# Patient Record
Sex: MALE | Race: WHITE | NOT HISPANIC OR LATINO | Employment: FULL TIME | URBAN - NONMETROPOLITAN AREA
[De-identification: names, ages, dates, MRNs, and addresses within clinical notes are randomized per-mention and may not be internally consistent; named-entity substitution may affect disease eponyms.]

---

## 2021-07-10 ENCOUNTER — HOSPITAL ENCOUNTER (OUTPATIENT)
Facility: HOSPITAL | Age: 51
Setting detail: OBSERVATION
Discharge: HOME | End: 2021-07-11
Attending: FAMILY MEDICINE | Admitting: INTERNAL MEDICINE

## 2021-07-10 ENCOUNTER — APPOINTMENT (OUTPATIENT)
Dept: GENERAL RADIOLOGY | Facility: HOSPITAL | Age: 51
End: 2021-07-10

## 2021-07-10 DIAGNOSIS — I48.91 NEW ONSET ATRIAL FIBRILLATION (HCC): Primary | ICD-10-CM

## 2021-07-10 LAB
ALBUMIN SERPL-MCNC: 4.6 G/DL (ref 3.5–5.2)
ALBUMIN/GLOB SERPL: 1.8 G/DL
ALP SERPL-CCNC: 82 U/L (ref 39–117)
ALT SERPL W P-5'-P-CCNC: 41 U/L (ref 1–41)
ANION GAP SERPL CALCULATED.3IONS-SCNC: 11 MMOL/L (ref 5–15)
AST SERPL-CCNC: 35 U/L (ref 1–40)
BASOPHILS # BLD AUTO: 0.1 10*3/MM3 (ref 0–0.2)
BASOPHILS NFR BLD AUTO: 0.6 % (ref 0–1.5)
BILIRUB SERPL-MCNC: 0.4 MG/DL (ref 0–1.2)
BUN SERPL-MCNC: 16 MG/DL (ref 6–20)
BUN/CREAT SERPL: 10.7 (ref 7–25)
CALCIUM SPEC-SCNC: 10.7 MG/DL (ref 8.6–10.5)
CHLORIDE SERPL-SCNC: 99 MMOL/L (ref 98–107)
CHOLEST SERPL-MCNC: 143 MG/DL (ref 0–200)
CK MB SERPL-CCNC: 1.88 NG/ML
CK SERPL-CCNC: 182 U/L (ref 20–200)
CO2 SERPL-SCNC: 23 MMOL/L (ref 22–29)
CREAT SERPL-MCNC: 1.5 MG/DL (ref 0.76–1.27)
D-DIMER, QUANTITATIVE (MAD,POW, STR): 406 NG/ML (FEU) (ref 0–470)
DEPRECATED RDW RBC AUTO: 38.7 FL (ref 37–54)
EOSINOPHIL # BLD AUTO: 0.08 10*3/MM3 (ref 0–0.4)
EOSINOPHIL NFR BLD AUTO: 0.5 % (ref 0.3–6.2)
ERYTHROCYTE [DISTWIDTH] IN BLOOD BY AUTOMATED COUNT: 12 % (ref 12.3–15.4)
FLUAV SUBTYP SPEC NAA+PROBE: NOT DETECTED
FLUBV RNA ISLT QL NAA+PROBE: NOT DETECTED
GFR SERPL CREATININE-BSD FRML MDRD: 50 ML/MIN/1.73
GFR SERPL CREATININE-BSD FRML MDRD: 60 ML/MIN/1.73
GLOBULIN UR ELPH-MCNC: 2.5 GM/DL
GLUCOSE SERPL-MCNC: 126 MG/DL (ref 65–99)
HCT VFR BLD AUTO: 48.3 % (ref 37.5–51)
HDLC SERPL-MCNC: 56 MG/DL (ref 40–60)
HGB BLD-MCNC: 16.2 G/DL (ref 13–17.7)
HOLD SPECIMEN: NORMAL
IMM GRANULOCYTES # BLD AUTO: 0.18 10*3/MM3 (ref 0–0.05)
IMM GRANULOCYTES NFR BLD AUTO: 1 % (ref 0–0.5)
LDLC SERPL CALC-MCNC: 55 MG/DL (ref 0–100)
LDLC/HDLC SERPL: 0.84 {RATIO}
LYMPHOCYTES # BLD AUTO: 2.88 10*3/MM3 (ref 0.7–3.1)
LYMPHOCYTES NFR BLD AUTO: 16.2 % (ref 19.6–45.3)
MAGNESIUM SERPL-MCNC: 1.9 MG/DL (ref 1.6–2.6)
MCH RBC QN AUTO: 29.8 PG (ref 26.6–33)
MCHC RBC AUTO-ENTMCNC: 33.5 G/DL (ref 31.5–35.7)
MCV RBC AUTO: 89 FL (ref 79–97)
MONOCYTES # BLD AUTO: 1.41 10*3/MM3 (ref 0.1–0.9)
MONOCYTES NFR BLD AUTO: 7.9 % (ref 5–12)
NEUTROPHILS NFR BLD AUTO: 13.1 10*3/MM3 (ref 1.7–7)
NEUTROPHILS NFR BLD AUTO: 73.8 % (ref 42.7–76)
NRBC BLD AUTO-RTO: 0 /100 WBC (ref 0–0.2)
PLATELET # BLD AUTO: 299 10*3/MM3 (ref 140–450)
PMV BLD AUTO: 10.1 FL (ref 6–12)
POTASSIUM SERPL-SCNC: 3.5 MMOL/L (ref 3.5–5.2)
PROT SERPL-MCNC: 7.1 G/DL (ref 6–8.5)
QT INTERVAL: 318 MS
QTC INTERVAL: 412 MS
RBC # BLD AUTO: 5.43 10*6/MM3 (ref 4.14–5.8)
SARS-COV-2 RNA PNL SPEC NAA+PROBE: NOT DETECTED
SODIUM SERPL-SCNC: 133 MMOL/L (ref 136–145)
TRIGL SERPL-MCNC: 199 MG/DL (ref 0–150)
TROPONIN T SERPL-MCNC: 0.12 NG/ML (ref 0–0.03)
TROPONIN T SERPL-MCNC: <0.01 NG/ML (ref 0–0.03)
TSH SERPL DL<=0.05 MIU/L-ACNC: 1.36 UIU/ML (ref 0.27–4.2)
VLDLC SERPL-MCNC: 32 MG/DL (ref 5–40)
WBC # BLD AUTO: 17.75 10*3/MM3 (ref 3.4–10.8)
WHOLE BLOOD HOLD SPECIMEN: NORMAL

## 2021-07-10 PROCEDURE — 84484 ASSAY OF TROPONIN QUANT: CPT | Performed by: FAMILY MEDICINE

## 2021-07-10 PROCEDURE — 96361 HYDRATE IV INFUSION ADD-ON: CPT

## 2021-07-10 PROCEDURE — 82550 ASSAY OF CK (CPK): CPT | Performed by: FAMILY MEDICINE

## 2021-07-10 PROCEDURE — 93005 ELECTROCARDIOGRAM TRACING: CPT | Performed by: NURSE PRACTITIONER

## 2021-07-10 PROCEDURE — G0378 HOSPITAL OBSERVATION PER HR: HCPCS

## 2021-07-10 PROCEDURE — 83735 ASSAY OF MAGNESIUM: CPT | Performed by: FAMILY MEDICINE

## 2021-07-10 PROCEDURE — C9803 HOPD COVID-19 SPEC COLLECT: HCPCS

## 2021-07-10 PROCEDURE — 80061 LIPID PANEL: CPT | Performed by: INTERNAL MEDICINE

## 2021-07-10 PROCEDURE — 96366 THER/PROPH/DIAG IV INF ADDON: CPT

## 2021-07-10 PROCEDURE — 96376 TX/PRO/DX INJ SAME DRUG ADON: CPT

## 2021-07-10 PROCEDURE — 71045 X-RAY EXAM CHEST 1 VIEW: CPT

## 2021-07-10 PROCEDURE — 85379 FIBRIN DEGRADATION QUANT: CPT | Performed by: FAMILY MEDICINE

## 2021-07-10 PROCEDURE — 93010 ELECTROCARDIOGRAM REPORT: CPT | Performed by: INTERNAL MEDICINE

## 2021-07-10 PROCEDURE — 85025 COMPLETE CBC W/AUTO DIFF WBC: CPT | Performed by: FAMILY MEDICINE

## 2021-07-10 PROCEDURE — 96372 THER/PROPH/DIAG INJ SC/IM: CPT

## 2021-07-10 PROCEDURE — 80053 COMPREHEN METABOLIC PANEL: CPT | Performed by: FAMILY MEDICINE

## 2021-07-10 PROCEDURE — 84443 ASSAY THYROID STIM HORMONE: CPT | Performed by: FAMILY MEDICINE

## 2021-07-10 PROCEDURE — 25010000002 ENOXAPARIN PER 10 MG: Performed by: INTERNAL MEDICINE

## 2021-07-10 PROCEDURE — 82553 CREATINE MB FRACTION: CPT | Performed by: FAMILY MEDICINE

## 2021-07-10 PROCEDURE — 96365 THER/PROPH/DIAG IV INF INIT: CPT

## 2021-07-10 PROCEDURE — 93005 ELECTROCARDIOGRAM TRACING: CPT | Performed by: INTERNAL MEDICINE

## 2021-07-10 PROCEDURE — 99284 EMERGENCY DEPT VISIT MOD MDM: CPT

## 2021-07-10 PROCEDURE — 87636 SARSCOV2 & INF A&B AMP PRB: CPT | Performed by: FAMILY MEDICINE

## 2021-07-10 PROCEDURE — 93005 ELECTROCARDIOGRAM TRACING: CPT | Performed by: FAMILY MEDICINE

## 2021-07-10 RX ORDER — ACETAMINOPHEN 160 MG/5ML
650 SOLUTION ORAL EVERY 4 HOURS PRN
Status: DISCONTINUED | OUTPATIENT
Start: 2021-07-10 | End: 2021-07-10 | Stop reason: SDUPTHER

## 2021-07-10 RX ORDER — DILTIAZEM HYDROCHLORIDE 5 MG/ML
INJECTION INTRAVENOUS
Status: COMPLETED
Start: 2021-07-10 | End: 2021-07-10

## 2021-07-10 RX ORDER — ONDANSETRON 2 MG/ML
4 INJECTION INTRAMUSCULAR; INTRAVENOUS EVERY 6 HOURS PRN
Status: DISCONTINUED | OUTPATIENT
Start: 2021-07-10 | End: 2021-07-11 | Stop reason: HOSPADM

## 2021-07-10 RX ORDER — METOPROLOL TARTRATE 5 MG/5ML
5 INJECTION INTRAVENOUS
Status: DISCONTINUED | OUTPATIENT
Start: 2021-07-11 | End: 2021-07-11

## 2021-07-10 RX ORDER — SODIUM CHLORIDE 9 MG/ML
INJECTION, SOLUTION INTRAVENOUS
Status: COMPLETED
Start: 2021-07-10 | End: 2021-07-10

## 2021-07-10 RX ORDER — DILTIAZEM HYDROCHLORIDE 5 MG/ML
10 INJECTION INTRAVENOUS ONCE
Status: COMPLETED | OUTPATIENT
Start: 2021-07-10 | End: 2021-07-10

## 2021-07-10 RX ORDER — ASPIRIN 81 MG/1
81 TABLET ORAL DAILY
Status: DISCONTINUED | OUTPATIENT
Start: 2021-07-11 | End: 2021-07-10

## 2021-07-10 RX ORDER — ASPIRIN 81 MG/1
81 TABLET ORAL DAILY
Status: DISCONTINUED | OUTPATIENT
Start: 2021-07-10 | End: 2021-07-11 | Stop reason: HOSPADM

## 2021-07-10 RX ORDER — ONDANSETRON 4 MG/1
4 TABLET, FILM COATED ORAL EVERY 6 HOURS PRN
Status: DISCONTINUED | OUTPATIENT
Start: 2021-07-10 | End: 2021-07-11 | Stop reason: HOSPADM

## 2021-07-10 RX ORDER — ACETAMINOPHEN 325 MG/1
650 TABLET ORAL EVERY 4 HOURS PRN
Status: DISCONTINUED | OUTPATIENT
Start: 2021-07-10 | End: 2021-07-11 | Stop reason: HOSPADM

## 2021-07-10 RX ORDER — SODIUM CHLORIDE 9 MG/ML
100 INJECTION, SOLUTION INTRAVENOUS CONTINUOUS
Status: DISCONTINUED | OUTPATIENT
Start: 2021-07-10 | End: 2021-07-11

## 2021-07-10 RX ORDER — ALUMINA, MAGNESIA, AND SIMETHICONE 2400; 2400; 240 MG/30ML; MG/30ML; MG/30ML
15 SUSPENSION ORAL EVERY 6 HOURS PRN
Status: DISCONTINUED | OUTPATIENT
Start: 2021-07-10 | End: 2021-07-11 | Stop reason: HOSPADM

## 2021-07-10 RX ORDER — SODIUM CHLORIDE 0.9 % (FLUSH) 0.9 %
10 SYRINGE (ML) INJECTION EVERY 12 HOURS SCHEDULED
Status: DISCONTINUED | OUTPATIENT
Start: 2021-07-10 | End: 2021-07-11 | Stop reason: HOSPADM

## 2021-07-10 RX ORDER — SODIUM CHLORIDE 0.9 % (FLUSH) 0.9 %
10 SYRINGE (ML) INJECTION AS NEEDED
Status: DISCONTINUED | OUTPATIENT
Start: 2021-07-10 | End: 2021-07-11 | Stop reason: HOSPADM

## 2021-07-10 RX ORDER — ACETAMINOPHEN 650 MG/1
650 SUPPOSITORY RECTAL EVERY 4 HOURS PRN
Status: DISCONTINUED | OUTPATIENT
Start: 2021-07-10 | End: 2021-07-11 | Stop reason: HOSPADM

## 2021-07-10 RX ADMIN — SODIUM CHLORIDE 1000 ML: 900 INJECTION, SOLUTION INTRAVENOUS at 16:47

## 2021-07-10 RX ADMIN — ENOXAPARIN SODIUM 40 MG: 100 INJECTION SUBCUTANEOUS at 20:12

## 2021-07-10 RX ADMIN — ASPIRIN 81 MG: 81 TABLET, FILM COATED ORAL at 20:12

## 2021-07-10 RX ADMIN — SODIUM CHLORIDE 1000 ML: 900 INJECTION, SOLUTION INTRAVENOUS at 15:57

## 2021-07-10 RX ADMIN — SODIUM CHLORIDE, PRESERVATIVE FREE 10 ML: 5 INJECTION INTRAVENOUS at 20:16

## 2021-07-10 RX ADMIN — METOPROLOL TARTRATE 25 MG: 25 TABLET, FILM COATED ORAL at 20:12

## 2021-07-10 RX ADMIN — SODIUM CHLORIDE 100 ML/HR: 900 INJECTION, SOLUTION INTRAVENOUS at 20:12

## 2021-07-10 RX ADMIN — DILTIAZEM HYDROCHLORIDE 10 MG: 5 INJECTION INTRAVENOUS at 15:56

## 2021-07-10 RX ADMIN — DILTIAZEM HYDROCHLORIDE 5 MG/HR: 100 INJECTION, POWDER, LYOPHILIZED, FOR SOLUTION INTRAVENOUS at 16:27

## 2021-07-10 NOTE — ED PROVIDER NOTES
Subjective   51-year-old male in the emergency department complaining of chest pain elevated heart rate.  Patient reports that he was laying some laminate floor today and approximately 1 hour ago noticed that he had some palpitations and some chest discomfort that went across his chest radiating to his left arm.  He was found to be in atrial fib flutter at a rate of 220.  Other than his obesity the patient tells me that he does not have any significant medical history.  He says he goes to his primary care and [facility] and has his yearly physical and labs.      History provided by:  Patient   used: No        Review of Systems   Constitutional: Positive for fatigue. Negative for chills.   HENT: Negative for congestion.    Respiratory: Negative for shortness of breath.    Cardiovascular: Positive for chest pain and palpitations.   Gastrointestinal: Negative for abdominal pain, diarrhea, nausea and vomiting.   Genitourinary: Negative for flank pain.   Musculoskeletal: Negative for gait problem.   Skin: Negative for wound.   Allergic/Immunologic: Negative for immunocompromised state.   Neurological: Positive for weakness.   Hematological: Negative for adenopathy.   Psychiatric/Behavioral: Negative for confusion.   All other systems reviewed and are negative.      No past medical history on file.    Allergies   Allergen Reactions   • Lortab [Hydrocodone-Acetaminophen] GI Intolerance       No past surgical history on file.    No family history on file.    Social History     Socioeconomic History   • Marital status:      Spouse name: Not on file   • Number of children: Not on file   • Years of education: Not on file   • Highest education level: Not on file           Objective   Physical Exam  Vitals and nursing note reviewed.   Constitutional:       Appearance: He is well-developed.   HENT:      Head: Normocephalic.      Nose: Nose normal.   Eyes:      Conjunctiva/sclera: Conjunctivae normal.       Pupils: Pupils are equal, round, and reactive to light.   Cardiovascular:      Rate and Rhythm: Tachycardia present. Rhythm irregularly irregular.      Heart sounds: Normal heart sounds.   Pulmonary:      Effort: Pulmonary effort is normal.      Breath sounds: Normal breath sounds.   Abdominal:      Palpations: Abdomen is soft.   Musculoskeletal:         General: Normal range of motion.      Cervical back: Normal range of motion.   Skin:     General: Skin is warm and dry.   Neurological:      Mental Status: He is alert and oriented to person, place, and time.      GCS: GCS eye subscore is 4. GCS verbal subscore is 5. GCS motor subscore is 6.         ECG 12 Lead      Date/Time: 7/10/2021 3:43 PM  Performed by: Ranjeet Tate APRN  Authorized by: Kirk Goldstein MD   Interpreted by physician  Comparison: not compared with previous ECG   Rhythm: atrial flutter and atrial fibrillation  Rate: tachycardic  Rate comments: 224  Conduction: conduction normal  ST Segments: ST segments normal  Other: no other findings  Clinical impression: abnormal ECG      ECG 12 Lead      Date/Time: 7/10/2021 4:30 PM  Performed by: Ranjeet Tate APRN  Authorized by: Ranjeet Tate APRN   Interpreted by physician  Comparison: compared with previous ECG from 7/10/2021  Comparison to previous ECG: Conversion with cardizem   Rhythm: sinus rhythm  Rate: normal  QRS axis: normal  Conduction: conduction normal  ST Segments: ST segments normal  Other: no other findings  Clinical impression: normal ECG                 ED Course                                           MDM  Number of Diagnoses or Management Options  New onset atrial fibrillation (CMS/HCC): new and requires workup  Diagnosis management comments: 51-year-old male in the emergency department, see original HPI for details.  Patient was found to be in a new onset of atrial fib flutter at a rate of 220.  Converted with IVP 10mg Cardizem.  Placed on Cardizem drip for  maintenance and admitted to the hospitalist service.       Amount and/or Complexity of Data Reviewed  Clinical lab tests: ordered and reviewed  Tests in the radiology section of CPT®: ordered and reviewed  Discuss the patient with other providers: yes  Independent visualization of images, tracings, or specimens: yes    Risk of Complications, Morbidity, and/or Mortality  Presenting problems: moderate  Diagnostic procedures: moderate  Management options: moderate    Patient Progress  Patient progress: stable      Final diagnoses:   New onset atrial fibrillation (CMS/HCC)       ED Disposition  ED Disposition     ED Disposition Condition Comment    Decision to Admit  Level of Care: Telemetry [5]   Diagnosis: New onset atrial fibrillation (CMS/HCC) [[id number]]   Admitting Physician: OZIEL EDWARD [[id number]]   Attending Physician: OZIEL EDWARD [[id number]]            No follow-up provider specified.       Medication List      No changes were made to your prescriptions during this visit.          Ranjeet Tate P, APRN  07/10/21 1703

## 2021-07-10 NOTE — H&P
[facility]  INPATIENT HISTORY AND PHYSICAL       Patient Care Team:  Erika Nichole MD as PCP - General (Family Medicine)    Chief complaint   Chief Complaint   Patient presents with   • Rapid Heart Rate       Subjective     Patient is a 51 y.o. male with history of morbid obesity presents with acute onset of chest discomfort and palpitation associated with nausea, shortness of air, diaphoresis and radiation to the left upper extremity.  He denies fever, chills, vomiting, hematemesis, melena, syncope or presyncope.    On triage, he was noted to have peak heart rate of 216 and initial EKG showed atrial flutter/A. fib with rapid ventricular response.  His chest x-ray was clear, initial troponin was negative, magnesium was 1.9 and TSH was normal.  Patient was given a dose of IV Cardizem push and followed by Cardizem infusion and converted to normal sinus rhythm.  He was less symptomatic during my assessment.        Review of Systems   Constitutional: Positive for activity change, diaphoresis and fatigue. Negative for appetite change, chills and fever.   HENT: Negative for trouble swallowing and voice change.    Eyes: Negative for photophobia and visual disturbance.   Respiratory: Positive for chest tightness and shortness of breath. Negative for cough, choking, wheezing and stridor.    Cardiovascular: Positive for palpitations. Negative for chest pain and leg swelling.   Gastrointestinal: Negative for abdominal distention, abdominal pain, blood in stool, constipation, diarrhea, nausea and vomiting.   Endocrine: Negative for cold intolerance, heat intolerance, polydipsia, polyphagia and polyuria.   Genitourinary: Negative for decreased urine volume, difficulty urinating, dysuria, enuresis, flank pain, frequency, hematuria and urgency.   Musculoskeletal: Negative for arthralgias, gait problem, myalgias, neck pain and neck stiffness.   Skin: Negative for  pallor, rash and wound.   Neurological: Negative for dizziness, tremors, seizures, syncope, facial asymmetry, speech difficulty, weakness, light-headedness, numbness and headaches.   Hematological: Does not bruise/bleed easily.   Psychiatric/Behavioral: Negative for agitation, behavioral problems and confusion.         History  No past medical history on file.  No past surgical history on file.  No family history on file.  Social History     Tobacco Use   • Smoking status: Not on file   Substance Use Topics   • Alcohol use: Not on file   • Drug use: Not on file     (Not in a hospital admission)    Allergies:  Lortab [hydrocodone-acetaminophen]  Prior to Admission medications    Not on File   Patient was not on any prescription medication as outpatient.    Objective        Vital Signs  Temp:  [96.4 °F (35.8 °C)] 96.4 °F (35.8 °C)  Heart Rate:  [] 90  Resp:  [20] 20  BP: (111-146)/(84-91) 146/91      Physical Exam  Vitals and nursing note reviewed.   Constitutional:       General: He is not in acute distress.     Appearance: He is well-developed. He is morbidly obese. He is not diaphoretic.   HENT:      Head: Normocephalic and atraumatic.   Eyes:      General: No scleral icterus.     Extraocular Movements: Extraocular movements intact.      Pupils: Pupils are equal, round, and reactive to light.   Neck:      Thyroid: No thyromegaly.      Vascular: No JVD.   Cardiovascular:      Rate and Rhythm: Normal rate and regular rhythm.      Heart sounds: Normal heart sounds. No murmur heard.   No friction rub. No gallop.    Pulmonary:      Effort: Pulmonary effort is normal. No respiratory distress.      Breath sounds: Normal breath sounds. No stridor. No wheezing or rales.   Chest:      Chest wall: No tenderness.   Abdominal:      General: Bowel sounds are normal. There is no distension.      Palpations: Abdomen is soft. There is no mass.      Tenderness: There is no abdominal tenderness. There is no right CVA  tenderness, left CVA tenderness, guarding or rebound.   Musculoskeletal:         General: No swelling, tenderness or deformity.      Cervical back: Normal range of motion and neck supple.      Right lower leg: No edema.      Left lower leg: No edema.   Skin:     General: Skin is warm and dry.      Coloration: Skin is not jaundiced or pale.      Findings: No erythema, lesion or rash.   Neurological:      General: No focal deficit present.      Mental Status: He is alert and oriented to person, place, and time.      Cranial Nerves: No cranial nerve deficit.      Sensory: No sensory deficit.      Motor: No weakness or abnormal muscle tone.      Coordination: Coordination normal.      Gait: Gait normal.      Deep Tendon Reflexes: Reflexes normal.   Psychiatric:         Mood and Affect: Mood normal.         Behavior: Behavior normal.         Thought Content: Thought content normal.         Judgment: Judgment normal.           Results Review:     Results from last 7 days   Lab Units 07/10/21  1553   SODIUM mmol/L 133*   POTASSIUM mmol/L 3.5   CHLORIDE mmol/L 99   CO2 mmol/L 23.0   BUN mg/dL 16   CREATININE mg/dL 1.50*   GLUCOSE mg/dL 126*   CALCIUM mg/dL 10.7*   BILIRUBIN mg/dL 0.4   ALK PHOS U/L 82   ALT (SGPT) U/L 41   AST (SGOT) U/L 35       Results from last 7 days   Lab Units 07/10/21  1553   MAGNESIUM mg/dL 1.9       Results from last 7 days   Lab Units 07/10/21  1554   WBC 10*3/mm3 17.75*   HEMOGLOBIN g/dL 16.2   HEMATOCRIT % 48.3   PLATELETS 10*3/mm3 299           Imaging Results (Last 7 Days)     Procedure Component Value Units Date/Time    XR Chest 1 View [[id number]] Collected: 07/10/21 1615     Updated: 07/10/21 1626    Narrative:        PORTABLE CHEST    HISTORY: Chest pain. Atrial fibrillation.    Portable AP upright film of the chest was obtained at 4:06 PM.  COMPARISON: [facility]    FINDINGS:   EKG leads.  The lungs are clear of an acute process.  The heart is not enlarged.  The pulmonary vasculature is not  increased.  No pleural effusion.  No pneumothorax.  No acute osseous abnormality.  Degenerative changes are present in the thoracic spine.      Impression:      CONCLUSION:  No Acute Disease    [id number]    Electronically signed by:  Phil Murguia MD  7/10/2021 4:25 PM CDT  Workstation: [id number]          Assessment / Plan       Hospital Problem List:    New onset atrial fibrillation (CMS/HCC)  Patient has converted to normal sinus rhythm.  Continue Cardizem infusion, begin p.o. Lopressor, check echocardiogram and consult cardiologist.  Patient's [id number] score is 0 and he will be started on daily aspirin.  Chest pain/chest pressure might be due to arrhythmia.  Will trend troponin, check lipid profile and schedule patient for CT coronary angiogram.  Leukocytosis likely due to acute illness and will be monitored.    Acute kidney injury (likely prerenal): There is no baseline creatinine on record.  Begin IV hydration, avoid nephrotoxins, and monitor renal function.  Mild hyponatremia is clinically not significant.    Begin GI and DVT prophylaxis.    Additional orders and treatment plan as hospital course dictates.    I confirmed that the patient's Advance Care Plan is present, code status is documented, or surrogate decision maker is listed in the patient's medical record.     I have utilized all available immediate resources to obtain, update, or review the patient's current medications    I discussed the patient's findings and my recommendations with patient, his wife and daughter.     Asa Zee MD  07/10/21  17:28 CDT      Dictated Utilizing Dragon Dictation

## 2021-07-10 NOTE — PLAN OF CARE
Goal Outcome Evaluation:         Pt  has normal sinus rhythm since 1605. Cardizem placed on hold. Dr Zee notified.

## 2021-07-11 ENCOUNTER — APPOINTMENT (OUTPATIENT)
Dept: CT IMAGING | Facility: HOSPITAL | Age: 51
End: 2021-07-11

## 2021-07-11 ENCOUNTER — APPOINTMENT (OUTPATIENT)
Dept: CARDIOLOGY | Facility: HOSPITAL | Age: 51
End: 2021-07-11

## 2021-07-11 VITALS
TEMPERATURE: 98.1 F | HEART RATE: 68 BPM | SYSTOLIC BLOOD PRESSURE: 127 MMHG | WEIGHT: 315 LBS | HEIGHT: 70 IN | DIASTOLIC BLOOD PRESSURE: 69 MMHG | OXYGEN SATURATION: 96 % | RESPIRATION RATE: 20 BRPM | BODY MASS INDEX: 45.1 KG/M2

## 2021-07-11 LAB
ANION GAP SERPL CALCULATED.3IONS-SCNC: 7 MMOL/L (ref 5–15)
BASOPHILS # BLD AUTO: 0.07 10*3/MM3 (ref 0–0.2)
BASOPHILS NFR BLD AUTO: 0.8 % (ref 0–1.5)
BUN SERPL-MCNC: 13 MG/DL (ref 6–20)
BUN/CREAT SERPL: 13.1 (ref 7–25)
CALCIUM SPEC-SCNC: 8.4 MG/DL (ref 8.6–10.5)
CHLORIDE SERPL-SCNC: 108 MMOL/L (ref 98–107)
CO2 SERPL-SCNC: 25 MMOL/L (ref 22–29)
CREAT SERPL-MCNC: 0.99 MG/DL (ref 0.76–1.27)
DEPRECATED RDW RBC AUTO: 39.8 FL (ref 37–54)
EOSINOPHIL # BLD AUTO: 0.31 10*3/MM3 (ref 0–0.4)
EOSINOPHIL NFR BLD AUTO: 3.4 % (ref 0.3–6.2)
ERYTHROCYTE [DISTWIDTH] IN BLOOD BY AUTOMATED COUNT: 12.3 % (ref 12.3–15.4)
GFR SERPL CREATININE-BSD FRML MDRD: 80 ML/MIN/1.73
GLUCOSE SERPL-MCNC: 93 MG/DL (ref 65–99)
HCT VFR BLD AUTO: 41.8 % (ref 37.5–51)
HGB BLD-MCNC: 14.1 G/DL (ref 13–17.7)
IMM GRANULOCYTES # BLD AUTO: 0.1 10*3/MM3 (ref 0–0.05)
IMM GRANULOCYTES NFR BLD AUTO: 1.1 % (ref 0–0.5)
LV EF 2D ECHO EST: 64 %
LYMPHOCYTES # BLD AUTO: 3.07 10*3/MM3 (ref 0.7–3.1)
LYMPHOCYTES NFR BLD AUTO: 33.6 % (ref 19.6–45.3)
MAXIMAL PREDICTED HEART RATE: 171 BPM
MCH RBC QN AUTO: 30.3 PG (ref 26.6–33)
MCHC RBC AUTO-ENTMCNC: 33.7 G/DL (ref 31.5–35.7)
MCV RBC AUTO: 89.9 FL (ref 79–97)
MONOCYTES # BLD AUTO: 0.89 10*3/MM3 (ref 0.1–0.9)
MONOCYTES NFR BLD AUTO: 9.7 % (ref 5–12)
NEUTROPHILS NFR BLD AUTO: 4.7 10*3/MM3 (ref 1.7–7)
NEUTROPHILS NFR BLD AUTO: 51.4 % (ref 42.7–76)
NRBC BLD AUTO-RTO: 0 /100 WBC (ref 0–0.2)
PLATELET # BLD AUTO: 226 10*3/MM3 (ref 140–450)
PMV BLD AUTO: 10.4 FL (ref 6–12)
POTASSIUM SERPL-SCNC: 3.8 MMOL/L (ref 3.5–5.2)
QT INTERVAL: 390 MS
QT INTERVAL: 390 MS
QTC INTERVAL: 408 MS
QTC INTERVAL: 408 MS
RBC # BLD AUTO: 4.65 10*6/MM3 (ref 4.14–5.8)
SODIUM SERPL-SCNC: 140 MMOL/L (ref 136–145)
STRESS TARGET HR: 145 BPM
TROPONIN T SERPL-MCNC: 0.09 NG/ML (ref 0–0.03)
WBC # BLD AUTO: 9.14 10*3/MM3 (ref 3.4–10.8)

## 2021-07-11 PROCEDURE — 96361 HYDRATE IV INFUSION ADD-ON: CPT

## 2021-07-11 PROCEDURE — 93306 TTE W/DOPPLER COMPLETE: CPT | Performed by: INTERNAL MEDICINE

## 2021-07-11 PROCEDURE — 75574 CT ANGIO HRT W/3D IMAGE: CPT

## 2021-07-11 PROCEDURE — G0378 HOSPITAL OBSERVATION PER HR: HCPCS

## 2021-07-11 PROCEDURE — 25010000002 ENOXAPARIN PER 10 MG: Performed by: INTERNAL MEDICINE

## 2021-07-11 PROCEDURE — 84484 ASSAY OF TROPONIN QUANT: CPT | Performed by: NURSE PRACTITIONER

## 2021-07-11 PROCEDURE — 36415 COLL VENOUS BLD VENIPUNCTURE: CPT | Performed by: INTERNAL MEDICINE

## 2021-07-11 PROCEDURE — 99204 OFFICE O/P NEW MOD 45 MIN: CPT | Performed by: INTERNAL MEDICINE

## 2021-07-11 PROCEDURE — 0 IOPAMIDOL PER 1 ML: Performed by: FAMILY MEDICINE

## 2021-07-11 PROCEDURE — 25010000002 PERFLUTREN (DEFINITY) 8.476 MG IN SODIUM CHLORIDE (PF) 0.9 % 10 ML INJECTION: Performed by: FAMILY MEDICINE

## 2021-07-11 PROCEDURE — 96372 THER/PROPH/DIAG INJ SC/IM: CPT

## 2021-07-11 PROCEDURE — 93306 TTE W/DOPPLER COMPLETE: CPT

## 2021-07-11 PROCEDURE — 80048 BASIC METABOLIC PNL TOTAL CA: CPT | Performed by: INTERNAL MEDICINE

## 2021-07-11 PROCEDURE — 85025 COMPLETE CBC W/AUTO DIFF WBC: CPT | Performed by: INTERNAL MEDICINE

## 2021-07-11 RX ORDER — ASPIRIN 81 MG/1
81 TABLET ORAL DAILY
Qty: 30 TABLET | Refills: 2 | Status: SHIPPED | OUTPATIENT
Start: 2021-07-12

## 2021-07-11 RX ORDER — LIDOCAINE HYDROCHLORIDE 20 MG/ML
15 SOLUTION OROPHARYNGEAL ONCE
Status: DISCONTINUED | OUTPATIENT
Start: 2021-07-11 | End: 2021-07-11 | Stop reason: HOSPADM

## 2021-07-11 RX ORDER — SODIUM CHLORIDE 9 MG/ML
100 INJECTION, SOLUTION INTRAVENOUS
Status: COMPLETED | OUTPATIENT
Start: 2021-07-11 | End: 2021-07-11

## 2021-07-11 RX ORDER — DILTIAZEM HYDROCHLORIDE 120 MG/1
120 CAPSULE, COATED, EXTENDED RELEASE ORAL
Qty: 30 CAPSULE | Refills: 2 | Status: SHIPPED | OUTPATIENT
Start: 2021-07-11 | End: 2021-08-16

## 2021-07-11 RX ORDER — DILTIAZEM HYDROCHLORIDE 120 MG/1
120 CAPSULE, COATED, EXTENDED RELEASE ORAL
Status: DISCONTINUED | OUTPATIENT
Start: 2021-07-11 | End: 2021-07-11 | Stop reason: HOSPADM

## 2021-07-11 RX ORDER — FENOFIBRATE 145 MG/1
145 TABLET, COATED ORAL DAILY
Status: DISCONTINUED | OUTPATIENT
Start: 2021-07-11 | End: 2021-07-11 | Stop reason: HOSPADM

## 2021-07-11 RX ORDER — ALUMINA, MAGNESIA, AND SIMETHICONE 2400; 2400; 240 MG/30ML; MG/30ML; MG/30ML
15 SUSPENSION ORAL ONCE
Status: DISCONTINUED | OUTPATIENT
Start: 2021-07-11 | End: 2021-07-11

## 2021-07-11 RX ORDER — FENOFIBRATE 145 MG/1
145 TABLET, COATED ORAL DAILY
Qty: 30 TABLET | Refills: 2 | Status: SHIPPED | OUTPATIENT
Start: 2021-07-12 | End: 2021-10-05 | Stop reason: SDUPTHER

## 2021-07-11 RX ADMIN — IOPAMIDOL 90 ML: 755 INJECTION, SOLUTION INTRAVENOUS at 09:02

## 2021-07-11 RX ADMIN — DILTIAZEM HYDROCHLORIDE 120 MG: 120 CAPSULE, COATED, EXTENDED RELEASE ORAL at 14:26

## 2021-07-11 RX ADMIN — SODIUM CHLORIDE 100 ML/HR: 900 INJECTION, SOLUTION INTRAVENOUS at 04:37

## 2021-07-11 RX ADMIN — FENOFIBRATE 145 MG: 145 TABLET ORAL at 09:37

## 2021-07-11 RX ADMIN — ASPIRIN 81 MG: 81 TABLET, FILM COATED ORAL at 09:36

## 2021-07-11 RX ADMIN — PERFLUTREN 1.5 ML: 6.52 INJECTION, SUSPENSION INTRAVENOUS at 11:32

## 2021-07-11 RX ADMIN — SODIUM CHLORIDE, PRESERVATIVE FREE 10 ML: 5 INJECTION INTRAVENOUS at 09:38

## 2021-07-11 RX ADMIN — SODIUM CHLORIDE 75 ML: 9 INJECTION, SOLUTION INTRAVENOUS at 09:03

## 2021-07-11 RX ADMIN — ENOXAPARIN SODIUM 40 MG: 100 INJECTION SUBCUTANEOUS at 09:37

## 2021-07-11 NOTE — PLAN OF CARE
Goal Outcome Evaluation:      Patient vitals WDL overnight with exception to heart rate between 50s and 70s. Patient bradycardic when sleeping. Upon waking and assessment patient is sinus rhythm. Still off of cardizem drip. Urine output around 1L.

## 2021-07-11 NOTE — DISCHARGE SUMMARY
[facility] Medicine Services  DISCHARGE SUMMARY       Date of Admission: 7/10/2021  Date of Discharge:  7/11/2021  Primary Care Physician: Erika Nichole MD    Presenting Problem/History of Present Illness:  New onset atrial fibrillation (CMS/HCC) [I48.91]       Final Discharge Diagnoses:  Active Hospital Problems    Diagnosis    • New onset atrial fibrillation (CMS/HCC)        Consults:   Consults     Date and Time Order Name Status Description    7/10/2021  5:27 PM Inpatient Cardiology Consult      7/10/2021  5:02 PM Hospitalist (on-call MD unless specified)            Procedures Performed: None.                Pertinent Test Results:   Lab Results (last 7 days)     Procedure Component Value Units Date/Time    Troponin [[id number]]  (Abnormal) Collected: 07/11/21 0306    Specimen: Blood Updated: 07/11/21 0829     Troponin T 0.085 ng/mL     Narrative:      Troponin T Reference Range:  <= 0.03 ng/mL-   Negative for AMI  >0.03 ng/mL-     Abnormal for myocardial necrosis.  Clinicians would have to utilize clinical acumen, EKG, Troponin and serial changes to determine if it is an Acute Myocardial Infarction or myocardial injury due to an underlying chronic condition.       Results may be falsely decreased if patient taking Biotin.      Basic Metabolic Panel [[id number]]  (Abnormal) Collected: 07/11/21 0306    Specimen: Blood Updated: 07/11/21 0457     Glucose 93 mg/dL      BUN 13 mg/dL      Creatinine 0.99 mg/dL      Sodium 140 mmol/L      Potassium 3.8 mmol/L      Chloride 108 mmol/L      CO2 25.0 mmol/L      Calcium 8.4 mg/dL      eGFR Non African Amer 80 mL/min/1.73      BUN/Creatinine Ratio 13.1     Anion Gap 7.0 mmol/L     Narrative:      GFR Normal >60  Chronic Kidney Disease <60  Kidney Failure <15      CBC Auto Differential [[id number]]  (Abnormal) Collected: 07/11/21 0306    Specimen: Blood Updated: 07/11/21 0444     WBC 9.14 10*3/mm3      RBC 4.65 10*6/mm3       Hemoglobin 14.1 g/dL      Hematocrit 41.8 %      MCV 89.9 fL      MCH 30.3 pg      MCHC 33.7 g/dL      RDW 12.3 %      RDW-SD 39.8 fl      MPV 10.4 fL      Platelets 226 10*3/mm3      Neutrophil % 51.4 %      Lymphocyte % 33.6 %      Monocyte % 9.7 %      Eosinophil % 3.4 %      Basophil % 0.8 %      Immature Grans % 1.1 %      Neutrophils, Absolute 4.70 10*3/mm3      Lymphocytes, Absolute 3.07 10*3/mm3      Monocytes, Absolute 0.89 10*3/mm3      Eosinophils, Absolute 0.31 10*3/mm3      Basophils, Absolute 0.07 10*3/mm3      Immature Grans, Absolute 0.10 10*3/mm3      nRBC 0.0 /100 WBC     Troponin [[id number]]  (Abnormal) Collected: 07/10/21 2147    Specimen: Blood Updated: 07/10/21 2214     Troponin T 0.120 ng/mL     Narrative:      Troponin T Reference Range:  <= 0.03 ng/mL-   Negative for AMI  >0.03 ng/mL-     Abnormal for myocardial necrosis.  Clinicians would have to utilize clinical acumen, EKG, Troponin and serial changes to determine if it is an Acute Myocardial Infarction or myocardial injury due to an underlying chronic condition.       Results may be falsely decreased if patient taking Biotin.      Lipid Panel [[id number]]  (Abnormal) Collected: 07/10/21 1554    Specimen: Blood Updated: 07/10/21 1743     Total Cholesterol 143 mg/dL      Triglycerides 199 mg/dL      HDL Cholesterol 56 mg/dL      LDL Cholesterol  55 mg/dL      VLDL Cholesterol 32 mg/dL      LDL/HDL Ratio 0.84    Narrative:      Cholesterol Reference Ranges  (U.S. Department of Health and Human Services ATP III Classifications)    Desirable          <200 mg/dL  Borderline High    200-239 mg/dL  High Risk          >240 mg/dL      Triglyceride Reference Ranges  (U.S. Department of Health and Human Services ATP III Classifications)    Normal           <150 mg/dL  Borderline High  150-199 mg/dL  High             200-499 mg/dL  Very High        >500 mg/dL    HDL Reference Ranges  (U.S. Department of Health and Human Services ATP III  Classifcations)    Low     <40 mg/dl (major risk factor for CHD)  High    >60 mg/dl ('negative' risk factor for CHD)        LDL Reference Ranges  (U.S. Department of Health and Human Services ATP III Classifcations)    Optimal          <100 mg/dL  Near Optimal     100-129 mg/dL  Borderline High  130-159 mg/dL  High             160-189 mg/dL  Very High        >189 mg/dL    COVID-19 and FLU A/B PCR - Swab, Nasopharynx [[id number]]  (Normal) Collected: 07/10/21 1648    Specimen: Swab from Nasopharynx Updated: 07/10/21 1719     COVID19 Not Detected     Influenza A PCR Not Detected     Influenza B PCR Not Detected    Narrative:      Fact sheet for providers: https://www.fda.gov/media/935896/download    Fact sheet for patients: https://www.fda.gov/media/860186/download    Test performed by PCR.    Extra Tubes [[id number]] Collected: 07/10/21 1554    Specimen: Blood, Venous Line Updated: 07/10/21 1700    Narrative:      The following orders were created for panel order Extra Tubes.  Procedure                               Abnormality         Status                     ---------                               -----------         ------                     Lavender Top[[id number]]                                     Final result                 Please view results for these tests on the individual orders.    Lavender Top [[id number]] Collected: 07/10/21 1554    Specimen: Blood Updated: 07/10/21 1700     Extra Tube hold for add-on     Comment: Auto resulted       Extra Tubes [[id number]] Collected: 07/10/21 1554    Specimen: Blood Updated: 07/10/21 1700    Narrative:      The following orders were created for panel order Extra Tubes.  Procedure                               Abnormality         Status                     ---------                               -----------         ------                     Gold Top - SST[[id number]]                                   Final result                 Please view results for these tests on  the individual orders.    Gold Top - SST [[id number]] Collected: 07/10/21 1554    Specimen: Blood Updated: 07/10/21 1700     Extra Tube Hold for add-ons.     Comment: Auto resulted.       CK-MB [[id number]]  (Normal) Collected: 07/10/21 1553    Specimen: Blood Updated: 07/10/21 1632     CKMB 1.88 ng/mL     Narrative:      Results may be falsely decreased if patient taking Biotin.      TSH [[id number]]  (Normal) Collected: 07/10/21 1553    Specimen: Blood Updated: 07/10/21 1632     TSH 1.360 uIU/mL     D-dimer, Quantitative [[id number]]  (Normal) Collected: 07/10/21 1607    Specimen: Blood Updated: 07/10/21 1629     D-Dimer, Quantitative 406 ng/mL (FEU)     Narrative:      Dimer values <500 ng/ml FEU are FDA approved as aid in diagnosis of deep venous thrombosis and pulmonary embolism.  This test should not be used in an exclusion strategy with pretest probability alone.    A recent guideline regarding diagnosis for pulmonary thromboembolism recommends an adjusted exclusion criterion of age x 10 ng/ml FEU for patients >50 years of age ([employer] Intern Med 2015; 163: 701-711).      CK [[id number]]  (Normal) Collected: 07/10/21 1553    Specimen: Blood Updated: 07/10/21 1625     Creatine Kinase 182 U/L     Magnesium [[id number]]  (Normal) Collected: 07/10/21 1553    Specimen: Blood Updated: 07/10/21 1625     Magnesium 1.9 mg/dL     Comprehensive Metabolic Panel [[id number]]  (Abnormal) Collected: 07/10/21 1553    Specimen: Blood Updated: 07/10/21 1614     Glucose 126 mg/dL      BUN 16 mg/dL      Creatinine 1.50 mg/dL      Sodium 133 mmol/L      Potassium 3.5 mmol/L      Chloride 99 mmol/L      CO2 23.0 mmol/L      Calcium 10.7 mg/dL      Total Protein 7.1 g/dL      Albumin 4.60 g/dL      ALT (SGPT) 41 U/L      AST (SGOT) 35 U/L      Alkaline Phosphatase 82 U/L      Total Bilirubin 0.4 mg/dL      eGFR Non African Amer 50 mL/min/1.73      eGFR  African Amer 60 mL/min/1.73      Globulin 2.5 gm/dL      A/G Ratio 1.8 g/dL       BUN/Creatinine Ratio 10.7     Anion Gap 11.0 mmol/L     Narrative:      GFR Normal >60  Chronic Kidney Disease <60  Kidney Failure <15      Troponin [[id number]]  (Normal) Collected: 07/10/21 1553    Specimen: Blood Updated: 07/10/21 1614     Troponin T <0.010 ng/mL     Narrative:      Troponin T Reference Range:  <= 0.03 ng/mL-   Negative for AMI  >0.03 ng/mL-     Abnormal for myocardial necrosis.  Clinicians would have to utilize clinical acumen, EKG, Troponin and serial changes to determine if it is an Acute Myocardial Infarction or myocardial injury due to an underlying chronic condition.       Results may be falsely decreased if patient taking Biotin.      CBC & Differential [[id number]]  (Abnormal) Collected: 07/10/21 1554    Specimen: Blood Updated: 07/10/21 1600    Narrative:      The following orders were created for panel order CBC & Differential.  Procedure                               Abnormality         Status                     ---------                               -----------         ------                     CBC Auto Differential[[id number]]        Abnormal            Final result                 Please view results for these tests on the individual orders.    CBC Auto Differential [[id number]]  (Abnormal) Collected: 07/10/21 1554    Specimen: Blood Updated: 07/10/21 1600     WBC 17.75 10*3/mm3      RBC 5.43 10*6/mm3      Hemoglobin 16.2 g/dL      Hematocrit 48.3 %      MCV 89.0 fL      MCH 29.8 pg      MCHC 33.5 g/dL      RDW 12.0 %      RDW-SD 38.7 fl      MPV 10.1 fL      Platelets 299 10*3/mm3      Neutrophil % 73.8 %      Lymphocyte % 16.2 %      Monocyte % 7.9 %      Eosinophil % 0.5 %      Basophil % 0.6 %      Immature Grans % 1.0 %      Neutrophils, Absolute 13.10 10*3/mm3      Lymphocytes, Absolute 2.88 10*3/mm3      Monocytes, Absolute 1.41 10*3/mm3      Eosinophils, Absolute 0.08 10*3/mm3      Basophils, Absolute 0.10 10*3/mm3      Immature Grans, Absolute 0.18 10*3/mm3      nRBC 0.0  /100 WBC         Imaging Results (Last 7 Days)     Procedure Component Value Units Date/Time    CT Angiogram Coronary [[id number]] Collected: 07/11/21 0841     Updated: 07/11/21 1400    Narrative:      PROCEDURE: CT HEART CORONARY ANGIOGRAM WITH IV CONTRAST    CLINICAL HISTORY: Chest pain/anginal equiv, 10yr CHD risk < 10%,  not treadmill candidate, I48.91 Unspecified atrial fibrillation    COMPARISON: None.    TECHNIQUE:  Serial axial CT images were obtained through the heart at 3 mm  thickness without contrast for calcium scoring.   Subsequently, following the intravenous administration of 90 ml  of Isovue-370, serial axial CT images were obtained through the  heart at 0.6 mm thickness utilizing retrospective  gating.   Post processing was performed by the radiologist at the [facility]  workstation.   3D images including vessel probing technique were also obtained.   Full field of view reconstructed images were used for evaluation  of the extracardiac tissues.    This exam was performed using radiation doses that are as low as  reasonably achievable (ALARA).  This exam was performed according to our departmental dose  optimization program, which includes automated exposure control,  adjustment of the mA and/or KV according to patient size and/or  use of iterative reconstruction technique.    FINDINGS:  CALCIUM PLAQUE BURDEN:    REGION                                         CALCIUM SCORE  ([place])  Left Main                                                      0   Left Anterior Descending                           0   Circumflex                                                   8   Right Coronary Artery                                 0     Total calcium score is 8    Implication: Minimal identifiable plaque  Risk of coronary artery disease: Very unlikely, less than 10%    CT FUNCTIONAL ANALYSIS:  Ejection Fraction    50 %  Diastolic Volume     179 ml  Systolic Volume      90 ml  Stroke Volume        89 ml  Cardiac  Output       4.8 L/minute    CTA OF THE CORONARY ARTERIES:   There is adequate visualization of the left main, LAD, diagonals,  circumflex, and RCA.   There is a type C LAD.   Coronary anatomy is right dominant    Left Main: No calcified or soft itssue density plaque and no  stenosis.  LAD: No calcified or soft tissue density plaque and no stenosis.  Circumflex: No calcified or soft tissue density plaque and no  stenosis.  RCA: No calcified or soft tisue density plaque and no stenosis.    ADDITIONAL FINDINGS:  The aortic valve is tricuspid.   There is no myocardial bridging.   On short axis views, the myocardium is homogeneous in thickness.  Question of a small hiatal hernia.  Postoperative changes in the gastroesophageal junction region.      Impression:      CONCLUSION:   No significant coronary artery stenosis.  Ejection fraction borderline, 50%.    Electronically signed by:  Chucky Villar MD  7/11/2021 1:59 PM CDT  Workstation: [id number]    XR Chest 1 View [[id number]] Collected: 07/10/21 1615     Updated: 07/10/21 1626    Narrative:        PORTABLE CHEST    HISTORY: Chest pain. Atrial fibrillation.    Portable AP upright film of the chest was obtained at 4:06 PM.  COMPARISON: Nine    FINDINGS:   EKG leads.  The lungs are clear of an acute process.  The heart is not enlarged.  The pulmonary vasculature is not increased.  No pleural effusion.  No pneumothorax.  No acute osseous abnormality.  Degenerative changes are present in the thoracic spine.      Impression:      CONCLUSION:  No Acute Disease    [id number]    Electronically signed by:  Phil Murguia MD  7/10/2021 4:25 PM CDT  Workstation: [id number]            Chief Complaint on Day of Discharge: None.    Hospital Course:  The patient did convert to normal sinus rhythm following initiation of Cardizem infusion in the ER, was admitted to CCU/stepdown unit and continued on Cardizem infusion with aspirin based on his [id number] score of 0.  He was seen by the  "cardiologist on consult who thought that initial rhythm was SVT.  He did have a peak troponin of 0.120 (likely due to acute illness) which did trend downwards and lipid profile showed evidence of triglyceridemia for which he was started on TriCor.  CT angiogram of the coronaries showed a total calcium score of 8 with no evidence of atherosclerosis and transthoracic echocardiogram showed:  · The study is technically difficult for diagnosis. The quality of the study is limited due to patient body habitus. Verbal consent was obtained from the patient to use Definity image enhancer in order to optimize the study.  · Estimated left ventricular EF = 64% Left ventricular ejection fraction appears to be 61 - 65%. Left ventricular systolic function is normal.  · Left ventricular diastolic function is consistent with (grade I) impaired relaxation.  · The right ventricular cavity is mildly dilated.  · Estimated right ventricular systolic pressure from tricuspid regurgitation is normal (<35 mmHg).    Patient was cleared for discharge by the cardiologist and recommended Cardizem  mg daily.  He will be seen for follow-up by the cardiologist as outpatient in 3 to 4 weeks.    Condition on Discharge: Stable and improved.    Physical Exam on Discharge:  /69   Pulse 68   Temp 98.1 °F (36.7 °C) (Oral)   Resp 20   Ht 177.8 cm (70\")   Wt (!) 146 kg (321 lb 14 oz)   SpO2 96%   BMI 46.18 kg/m²   Physical Exam  Vitals and nursing note reviewed.   Constitutional:       General: He is not in acute distress.     Appearance: He is well-developed. He is morbidly obese. He is not diaphoretic.   HENT:      Head: Normocephalic and atraumatic.   Eyes:      General: No scleral icterus.     Extraocular Movements: Extraocular movements intact.      Pupils: Pupils are equal, round, and reactive to light.   Neck:      Thyroid: No thyromegaly.      Vascular: No JVD.   Cardiovascular:      Rate and Rhythm: Normal rate and regular rhythm. "      Heart sounds: Normal heart sounds. No murmur heard.   No friction rub. No gallop.    Pulmonary:      Effort: Pulmonary effort is normal. No respiratory distress.      Breath sounds: Normal breath sounds. No wheezing, rhonchi or rales.   Chest:      Chest wall: No tenderness.   Abdominal:      General: Bowel sounds are normal. There is no distension.      Palpations: Abdomen is soft. There is no mass.      Tenderness: There is no abdominal tenderness. There is no right CVA tenderness, left CVA tenderness, guarding or rebound.   Musculoskeletal:         General: No swelling, tenderness or deformity.      Cervical back: Normal range of motion and neck supple.      Right lower leg: No edema.      Left lower leg: No edema.   Skin:     General: Skin is warm and dry.      Coloration: Skin is not jaundiced or pale.      Findings: No bruising, erythema, lesion or rash.   Neurological:      General: No focal deficit present.      Mental Status: He is alert and oriented to person, place, and time. Mental status is at baseline.      Cranial Nerves: No cranial nerve deficit.      Sensory: No sensory deficit.      Motor: No weakness or abnormal muscle tone.      Coordination: Coordination normal.      Gait: Gait normal.      Deep Tendon Reflexes: Reflexes normal.   Psychiatric:         Mood and Affect: Mood normal.         Behavior: Behavior normal.         Thought Content: Thought content normal.         Judgment: Judgment normal.           Discharge Disposition:  Home or Self Care    Discharge Medications:     Discharge Medications      New Medications      Instructions Start Date   aspirin 81 MG EC tablet   81 mg, Oral, Daily   Start Date: July 12, 2021     dilTIAZem  MG 24 hr capsule  Commonly known as: CARDIZEM CD   120 mg, Oral, Every 24 Hours Scheduled      fenofibrate 145 MG tablet  Commonly known as: TRICOR   145 mg, Oral, Daily   Start Date: July 12, 2021            Discharge Diet: Heart healthy.     Activity  at Discharge: As tolerated.      Discharge Care Plan/Instructions: Patient has been advised to take his medications as prescribed and to return to the emergency room in the event of any worsening symptoms.    Follow-up Appointments: Follow-up primary care provider in 1 week and with the cardiologist as outpatient in 3 to 4 weeks.    Test Results Pending at Discharge:     Asa Zee MD  07/11/21  14:17 CDT    Time: 35 minutes.

## 2021-07-11 NOTE — CONSULTS
CARDIOLOGY CONSULTATION NOTE    Referring Provider: Kirk Goldstein MD/ hospitalist service    Reason for Consultation: Evaluation of tachycardia/SVT/atrial flutter    Jovan Franco  1970  51 y.o. male      HPI  Jovan Franco is a 51-year-old  male with history of morbid obesity who presented to the emergency room following an episode of chest pain and palpitation which occurred at about 2:30 PM.  The patient was apparently laying some laminate floor and bending forwards when he initially developed pain in the epigastrium that went up his chest and both shoulders, associated with palpitation.  He felt fatigued with some degree of sweating and his symptoms persisted till he was given medications in the emergency room.     Initial EKG at 3:43 PM yesterday showed a heart rate of 224 bpm with narrow complex tachycardia suggestive of supraventricular tachycardia.  There were nonspecific ST-T changes.  He was given adenosine and subsequently IV Cardizem bolus followed by infusion, and he converted to sinus rhythm and EKG subsequently showed sinus rhythm with no signs of preexcitation or ST-T wave changes diagnostic of ischemia.  His bilateral shoulder discomfort persisted until his heart rate slowed down.  Initial troponin was normal but subsequent troponin was elevated at 0.120.  It has been trending downwards.  The patient recalls a similar incident about 2 to 3 years prior, which lasted for a few minutes.  He did not seek medical help.  He has history of borderline hypertension, morbid obesity, sleep apnea and he uses CPAP on a regular basis.  He denied using excess amounts of caffeine, over-the-counter medications, alcohol or drugs.  He has no previous documented coronary artery disease or valvular heart disease.  No fevers, chills, nausea, vomiting or syncope with reported.  There is no history of thyroid problems.   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    SUBJECTIVE    No past  "medical history on file.      No past surgical history on file.      No family history on file.      Social History     Socioeconomic History   • Marital status:      Spouse name: Not on file   • Number of children: Not on file   • Years of education: Not on file   • Highest education level: Not on file         Allergies   Allergen Reactions   • Lortab [Hydrocodone-Acetaminophen] GI Intolerance         Current Facility-Administered Medications   Medication Dose Route Frequency Provider Last Rate Last Admin   • acetaminophen (TYLENOL) tablet 650 mg  650 mg Oral Q4H PRN Asa Zee MD        Or   • acetaminophen (TYLENOL) suppository 650 mg  650 mg Rectal Q4H PRN Asa Zee MD       • aluminum-magnesium hydroxide-simethicone (MAALOX MAX) 400-400-40 MG/5ML suspension 15 mL  15 mL Oral Q6H PRN Asa Zee MD       • aspirin EC tablet 81 mg  81 mg Oral Daily Asa Zee MD   81 mg at 07/10/21 2012   • enoxaparin (LOVENOX) syringe 40 mg  40 mg Subcutaneous Q12H Asa Zee MD   40 mg at 07/10/21 2012   • fenofibrate (TRICOR) tablet 145 mg  145 mg Oral Daily Asa Zee MD       • metoprolol tartrate (LOPRESSOR) injection 5 mg  5 mg Intravenous Q5 Min PRN Asa Zee MD       • ondansetron (ZOFRAN) tablet 4 mg  4 mg Oral Q6H PRN Asa Zee MD        Or   • ondansetron (ZOFRAN) injection 4 mg  4 mg Intravenous Q6H PRN Asa Zee MD       • sodium chloride 0.9 % flush 10 mL  10 mL Intravenous Q12H Asa Zee MD   10 mL at 07/10/21 2016   • sodium chloride 0.9 % flush 10 mL  10 mL Intravenous PRN Asa Zee MD       • sodium chloride 0.9 % infusion  100 mL/hr Intravenous Continuous Asa Zee  mL/hr at 07/11/21 0652 100 mL/hr at 07/11/21 0652         OBJECTIVE    /68 (BP Location: Left arm, Patient Position: Lying)   Pulse (!) 49   Temp 98 °F (36.7 °C) (Oral)   Resp 20   Ht 177.8 cm (70\")   Wt (!) 146 " kg (322 lb 12.1 oz)   SpO2 97%   BMI 46.31 kg/m²       Review of Systems :    Constitutional:  Denies recent weight loss, weight gain, fever or chills, no change in exercise tolerance.     HENT:  Denies any hearing loss, epistaxis, hoarseness, or difficulty speaking.     Eyes: Wears eyeglasses or contact lenses .    Respiratory:  Denies dyspnea with exertion.  History of obesity, sleep apnea on CPAP    Cardiovascular: See HPI    Gastrointestinal:  Denies change in bowel habits, dyspepsia, ulcer disease, hematochezia, or melena.     Endocrine: Negative for cold intolerance, heat intolerance, polydipsia, polyphagia and polyuria. Denies any history of weight change, heat/cold intolerance, polydipsia, polyuria.     Genitourinary: Negative.      Musculoskeletal: Denies any history of arthritic symptoms or back problems.     Skin:  Denies any change in hair or nails, rashes, or skin lesions.     Allergic/Immunologic: Negative.  Negative for environmental allergies, food allergies and immunocompromised state.     Neurological:  Denies any history of recurrent headaches, strokes, TIA, or seizure disorder.     Hematological: Denies any food allergies, seasonal allergies, bleeding disorders, or lymphadenopathy.     Psychiatric/Behavioral: Denies any history of depression, substance abuse, or change in cognitive function.       Physical Exam:     Constitutional: Cooperative, alert and oriented, well-developed, well-nourished, in no acute distress.  Morbidly obese    HENT:   Head: Normocephalic, normal hair patterns, no masses or tenderness.  Ears, Nose, and Throat: No gross abnormalities. No pallor or cyanosis. Dentition good.   Eyes: EOMS intact, PERRL, conjunctivae and lids unremarkable. Fundoscopic exam and visual fields not performed.   Neck: No palpable masses or adenopathy, no thyromegaly, no JVD, carotid pulses are full and equal bilaterally and without bruit.     Cardiovascular: Regular rhythm, S1 and S2 normal, no  S3 or S4. No murmurs, gallops, or rubs detected.     Pulmonary/Chest: Chest: normal symmetry,  normal respiratory excursion, no intercostal retraction, no use of accessory muscles. Pulmonary: Normal breath sounds - no rales or rhonchi.    Abdominal: Abdomen soft, bowel sounds normoactive, no masses, no hepatosplenomegaly, nontender, no bruits.     Musculoskeletal: No deformities, clubbing, cyanosis, erythema, or edema observed. There are no spinal abnormalities noted. Normal muscle strength and tone. Pulses full and equal in all extremities, no bruits auscultated.     Neurological: No gross motor or sensory deficits noted, Cranial nerves 2-12 normal. affect appropriate, oriented to time, person, place.     Skin: Warm and dry to the touch, no apparent skin lesions or masses noted.     Psychiatric: Normal mood and affect. Behavior is normal. Judgment and thought content normal.     RESULTS  Lab Results (last 24 hours)     Procedure Component Value Units Date/Time    Troponin [[id number]]  (Abnormal) Collected: 07/11/21 0306    Specimen: Blood Updated: 07/11/21 0829     Troponin T 0.085 ng/mL     Narrative:      Troponin T Reference Range:  <= 0.03 ng/mL-   Negative for AMI  >0.03 ng/mL-     Abnormal for myocardial necrosis.  Clinicians would have to utilize clinical acumen, EKG, Troponin and serial changes to determine if it is an Acute Myocardial Infarction or myocardial injury due to an underlying chronic condition.       Results may be falsely decreased if patient taking Biotin.      Basic Metabolic Panel [[id number]]  (Abnormal) Collected: 07/11/21 0306    Specimen: Blood Updated: 07/11/21 0457     Glucose 93 mg/dL      BUN 13 mg/dL      Creatinine 0.99 mg/dL      Sodium 140 mmol/L      Potassium 3.8 mmol/L      Chloride 108 mmol/L      CO2 25.0 mmol/L      Calcium 8.4 mg/dL      eGFR Non African Amer 80 mL/min/1.73      BUN/Creatinine Ratio 13.1     Anion Gap 7.0 mmol/L     Narrative:      GFR Normal >60  Chronic  Kidney Disease <60  Kidney Failure <15      CBC Auto Differential [[id number]]  (Abnormal) Collected: 07/11/21 0306    Specimen: Blood Updated: 07/11/21 0444     WBC 9.14 10*3/mm3      RBC 4.65 10*6/mm3      Hemoglobin 14.1 g/dL      Hematocrit 41.8 %      MCV 89.9 fL      MCH 30.3 pg      MCHC 33.7 g/dL      RDW 12.3 %      RDW-SD 39.8 fl      MPV 10.4 fL      Platelets 226 10*3/mm3      Neutrophil % 51.4 %      Lymphocyte % 33.6 %      Monocyte % 9.7 %      Eosinophil % 3.4 %      Basophil % 0.8 %      Immature Grans % 1.1 %      Neutrophils, Absolute 4.70 10*3/mm3      Lymphocytes, Absolute 3.07 10*3/mm3      Monocytes, Absolute 0.89 10*3/mm3      Eosinophils, Absolute 0.31 10*3/mm3      Basophils, Absolute 0.07 10*3/mm3      Immature Grans, Absolute 0.10 10*3/mm3      nRBC 0.0 /100 WBC     Troponin [[id number]]  (Abnormal) Collected: 07/10/21 2147    Specimen: Blood Updated: 07/10/21 2214     Troponin T 0.120 ng/mL     Narrative:      Troponin T Reference Range:  <= 0.03 ng/mL-   Negative for AMI  >0.03 ng/mL-     Abnormal for myocardial necrosis.  Clinicians would have to utilize clinical acumen, EKG, Troponin and serial changes to determine if it is an Acute Myocardial Infarction or myocardial injury due to an underlying chronic condition.       Results may be falsely decreased if patient taking Biotin.      Lipid Panel [[id number]]  (Abnormal) Collected: 07/10/21 1554    Specimen: Blood Updated: 07/10/21 1743     Total Cholesterol 143 mg/dL      Triglycerides 199 mg/dL      HDL Cholesterol 56 mg/dL      LDL Cholesterol  55 mg/dL      VLDL Cholesterol 32 mg/dL      LDL/HDL Ratio 0.84    Narrative:      Cholesterol Reference Ranges  (U.S. Department of Health and Human Services ATP III Classifications)    Desirable          <200 mg/dL  Borderline High    200-239 mg/dL  High Risk          >240 mg/dL      Triglyceride Reference Ranges  (U.S. Department of Health and Human Services ATP III Classifications)    Normal            <150 mg/dL  Borderline High  150-199 mg/dL  High             200-499 mg/dL  Very High        >500 mg/dL    HDL Reference Ranges  (U.S. Department of Health and Human Services ATP III Classifcations)    Low     <40 mg/dl (major risk factor for CHD)  High    >60 mg/dl ('negative' risk factor for CHD)        LDL Reference Ranges  (U.S. Department of Health and Human Services ATP III Classifcations)    Optimal          <100 mg/dL  Near Optimal     100-129 mg/dL  Borderline High  130-159 mg/dL  High             160-189 mg/dL  Very High        >189 mg/dL    COVID-19 and FLU A/B PCR - Swab, Nasopharynx [[id number]]  (Normal) Collected: 07/10/21 1648    Specimen: Swab from Nasopharynx Updated: 07/10/21 1719     COVID19 Not Detected     Influenza A PCR Not Detected     Influenza B PCR Not Detected    Narrative:      Fact sheet for providers: https://www.fda.gov/media/416777/download    Fact sheet for patients: https://www.fda.gov/media/445450/download    Test performed by PCR.    Extra Tubes [[id number]] Collected: 07/10/21 1554    Specimen: Blood, Venous Line Updated: 07/10/21 1700    Narrative:      The following orders were created for panel order Extra Tubes.  Procedure                               Abnormality         Status                     ---------                               -----------         ------                     Lavender Top[[id number]]                                     Final result                 Please view results for these tests on the individual orders.    Lavender Top [[id number]] Collected: 07/10/21 1554    Specimen: Blood Updated: 07/10/21 1700     Extra Tube hold for add-on     Comment: Auto resulted       Extra Tubes [[id number]] Collected: 07/10/21 1554    Specimen: Blood Updated: 07/10/21 1700    Narrative:      The following orders were created for panel order Extra Tubes.  Procedure                               Abnormality         Status                     ---------                                -----------         ------                     Gold Top - SST[[id number]]                                   Final result                 Please view results for these tests on the individual orders.    Gold Top - SST [[id number]] Collected: 07/10/21 1554    Specimen: Blood Updated: 07/10/21 1700     Extra Tube Hold for add-ons.     Comment: Auto resulted.       CK-MB [[id number]]  (Normal) Collected: 07/10/21 1553    Specimen: Blood Updated: 07/10/21 1632     CKMB 1.88 ng/mL     Narrative:      Results may be falsely decreased if patient taking Biotin.      TSH [[id number]]  (Normal) Collected: 07/10/21 1553    Specimen: Blood Updated: 07/10/21 1632     TSH 1.360 uIU/mL     D-dimer, Quantitative [[id number]]  (Normal) Collected: 07/10/21 1607    Specimen: Blood Updated: 07/10/21 1629     D-Dimer, Quantitative 406 ng/mL (FEU)     Narrative:      Dimer values <500 ng/ml FEU are FDA approved as aid in diagnosis of deep venous thrombosis and pulmonary embolism.  This test should not be used in an exclusion strategy with pretest probability alone.    A recent guideline regarding diagnosis for pulmonary thromboembolism recommends an adjusted exclusion criterion of age x 10 ng/ml FEU for patients >50 years of age ([employer] Med 2015; 163: 701-711).      CK [[id number]]  (Normal) Collected: 07/10/21 1553    Specimen: Blood Updated: 07/10/21 1625     Creatine Kinase 182 U/L     Magnesium [[id number]]  (Normal) Collected: 07/10/21 1553    Specimen: Blood Updated: 07/10/21 1625     Magnesium 1.9 mg/dL     Comprehensive Metabolic Panel [[id number]]  (Abnormal) Collected: 07/10/21 1553    Specimen: Blood Updated: 07/10/21 1614     Glucose 126 mg/dL      BUN 16 mg/dL      Creatinine 1.50 mg/dL      Sodium 133 mmol/L      Potassium 3.5 mmol/L      Chloride 99 mmol/L      CO2 23.0 mmol/L      Calcium 10.7 mg/dL      Total Protein 7.1 g/dL      Albumin 4.60 g/dL      ALT (SGPT) 41 U/L      AST (SGOT) 35 U/L      Alkaline  Phosphatase 82 U/L      Total Bilirubin 0.4 mg/dL      eGFR Non African Amer 50 mL/min/1.73      eGFR  African Amer 60 mL/min/1.73      Globulin 2.5 gm/dL      A/G Ratio 1.8 g/dL      BUN/Creatinine Ratio 10.7     Anion Gap 11.0 mmol/L     Narrative:      GFR Normal >60  Chronic Kidney Disease <60  Kidney Failure <15      Troponin [[id number]]  (Normal) Collected: 07/10/21 1553    Specimen: Blood Updated: 07/10/21 1614     Troponin T <0.010 ng/mL     Narrative:      Troponin T Reference Range:  <= 0.03 ng/mL-   Negative for AMI  >0.03 ng/mL-     Abnormal for myocardial necrosis.  Clinicians would have to utilize clinical acumen, EKG, Troponin and serial changes to determine if it is an Acute Myocardial Infarction or myocardial injury due to an underlying chronic condition.       Results may be falsely decreased if patient taking Biotin.      CBC & Differential [[id number]]  (Abnormal) Collected: 07/10/21 1554    Specimen: Blood Updated: 07/10/21 1600    Narrative:      The following orders were created for panel order CBC & Differential.  Procedure                               Abnormality         Status                     ---------                               -----------         ------                     CBC Auto Differential[[id number]]        Abnormal            Final result                 Please view results for these tests on the individual orders.    CBC Auto Differential [[id number]]  (Abnormal) Collected: 07/10/21 1554    Specimen: Blood Updated: 07/10/21 1600     WBC 17.75 10*3/mm3      RBC 5.43 10*6/mm3      Hemoglobin 16.2 g/dL      Hematocrit 48.3 %      MCV 89.0 fL      MCH 29.8 pg      MCHC 33.5 g/dL      RDW 12.0 %      RDW-SD 38.7 fl      MPV 10.1 fL      Platelets 299 10*3/mm3      Neutrophil % 73.8 %      Lymphocyte % 16.2 %      Monocyte % 7.9 %      Eosinophil % 0.5 %      Basophil % 0.6 %      Immature Grans % 1.0 %      Neutrophils, Absolute 13.10 10*3/mm3      Lymphocytes, Absolute 2.88  10*3/mm3      Monocytes, Absolute 1.41 10*3/mm3      Eosinophils, Absolute 0.08 10*3/mm3      Basophils, Absolute 0.10 10*3/mm3      Immature Grans, Absolute 0.18 10*3/mm3      nRBC 0.0 /100 WBC         Imaging Results (Last 24 Hours)     Procedure Component Value Units Date/Time    CT Angiogram Coronary [[id number]] Resulted: 07/11/21 0902     Updated: 07/11/21 0904    XR Chest 1 View [[id number]] Collected: 07/10/21 1615     Updated: 07/10/21 1626    Narrative:        PORTABLE CHEST    HISTORY: Chest pain. Atrial fibrillation.    Portable AP upright film of the chest was obtained at 4:06 PM.  COMPARISON: [facility]    FINDINGS:   EKG leads.  The lungs are clear of an acute process.  The heart is not enlarged.  The pulmonary vasculature is not increased.  No pleural effusion.  No pneumothorax.  No acute osseous abnormality.  Degenerative changes are present in the thoracic spine.      Impression:      CONCLUSION:  No Acute Disease    [id number]    Electronically signed by:  Phil Murguia MD  7/10/2021 4:25 PM CDT  Workstation: [id number]            ASSESSMENT AND PLAN  Jovan Franco is a 51-year-old male with no significant past medical history other than obesity, sleep apnea on CPAP, who presented following an episode of palpitation associated with epigastric/chest and shoulder pain with initial EKG showing narrow complex tachycardia with a heart rate of 224 bpm suggestive of supraventricular tachycardia.  He has converted to sinus rhythm following IV Cardizem and has remained in sinus rhythm since admission.  Troponins have been borderline elevated at 0.12 and this could be related to arrhythmia, though I agree that proceeding with a CT angiogram to rule out coronary artery disease would be appropriate.  D-dimer has been negative.  It appears that he was dehydrated and his renal function which was initially abnormal has returned to normal.    I will review the echocardiogram that has been ordered.  The exact etiology for the  arrhythmia is unclear though AV aidan reentry is a possibility.  He appears to have had a similar episode about 2 to 3 years prior.  Obesity and sleep apnea does put him at increased risk for arrhythmia.  His thyroid function studies are within normal limits.  If cardiac testing is unremarkable at this time, I believe that a trial of metoprolol succinate 25 mg daily or diltiazem  mg daily would be reasonable.  If he has recurrence of the symptoms in the future referral to EP specialist would be appropriate.  Aggressive risk factor modification and weight reduction recommended.  Thank you for asked me to see this patient.    Zachariah Lambert MD  7/11/2021  09:15 CDT

## 2021-08-16 ENCOUNTER — OFFICE VISIT (OUTPATIENT)
Dept: CARDIOLOGY | Facility: CLINIC | Age: 51
End: 2021-08-16

## 2021-08-16 VITALS
DIASTOLIC BLOOD PRESSURE: 82 MMHG | SYSTOLIC BLOOD PRESSURE: 128 MMHG | BODY MASS INDEX: 33.24 KG/M2 | TEMPERATURE: 98.7 F | HEIGHT: 70 IN | WEIGHT: 232.2 LBS | HEART RATE: 80 BPM | OXYGEN SATURATION: 97 %

## 2021-08-16 DIAGNOSIS — I47.1 PAROXYSMAL SVT (SUPRAVENTRICULAR TACHYCARDIA) (HCC): ICD-10-CM

## 2021-08-16 DIAGNOSIS — I48.91 NEW ONSET ATRIAL FIBRILLATION (HCC): Primary | ICD-10-CM

## 2021-08-16 DIAGNOSIS — G47.33 OBSTRUCTIVE SLEEP APNEA SYNDROME: ICD-10-CM

## 2021-08-16 DIAGNOSIS — E78.1 HYPERTRIGLYCERIDEMIA: ICD-10-CM

## 2021-08-16 PROBLEM — E66.9 CLASS 1 OBESITY WITH BODY MASS INDEX (BMI) OF 33.0 TO 33.9 IN ADULT: Status: ACTIVE | Noted: 2021-08-16

## 2021-08-16 PROCEDURE — 99214 OFFICE O/P EST MOD 30 MIN: CPT | Performed by: INTERNAL MEDICINE

## 2021-08-16 RX ORDER — DILTIAZEM HYDROCHLORIDE 180 MG/1
180 CAPSULE, COATED, EXTENDED RELEASE ORAL DAILY
Qty: 90 CAPSULE | Refills: 3 | Status: SHIPPED | OUTPATIENT
Start: 2021-08-16 | End: 2021-10-05 | Stop reason: SDUPTHER

## 2021-08-16 RX ORDER — BENAZEPRIL HYDROCHLORIDE 10 MG/1
10 TABLET ORAL DAILY
COMMUNITY

## 2021-08-16 RX ORDER — MELOXICAM 15 MG/1
TABLET ORAL
COMMUNITY
Start: 2021-02-22

## 2021-08-16 NOTE — PROGRESS NOTES
Jovan Franco  51 y.o. male    08/16/2021  1. New onset atrial fibrillation (CMS/HCC)    2. Paroxysmal SVT (supraventricular tachycardia) (CMS/HCC)    3. Obstructive sleep apnea syndrome    4. Hypertriglyceridemia        History of Present Illness  Jovan Franco is a 51-year-old male with no significant past medical history other than obesity, sleep apnea on CPAP, who presented in July 2021 following an episode of palpitation associated with epigastric/chest and shoulder pain with initial EKG showing narrow complex tachycardia with a heart rate of 224 bpm suggestive of supraventricular tachycardia.  He converted to sinus rhythm following IV Cardizem and remained in sinus rhythm during his hospital stay.  Troponins were borderline elevated at 0.12 and D-dimer was negative.    Echocardiogram showed:  · The study is technically difficult for diagnosis. The quality of the study is limited due to patient body habitus. Verbal consent was obtained from the patient to use Definity image enhancer in order to optimize the study.  · Estimated left ventricular EF = 64% Left ventricular ejection fraction appears to be 61 - 65%. Left ventricular systolic function is normal.  · Left ventricular diastolic function is consistent with (grade I) impaired relaxation.  · The right ventricular cavity is mildly dilated.  · Estimated right ventricular systolic pressure from tricuspid regurgitation is normal (<35 mmHg).    CT angiogram of the coronary arteries showed a calcium score of 0 and no epicardial coronary artery disease.  EF was 50%.     Following discharge from the hospital, the patient has had 1 brief episode of fast heart rate lasting just for a few seconds.  He denied any chest pain, shortness of breath, dizziness or syncope.    Clinical exam today was unremarkable.  Heart rate and blood pressure were in the normal range.    Allergies   Allergen Reactions   • Lortab [Hydrocodone-Acetaminophen] GI Intolerance         Past Medical  "History:   Diagnosis Date   • Arrhythmia    • Atrial fibrillation (CMS/HCC)    • Hypertension          History reviewed. No pertinent surgical history.      History reviewed. No pertinent family history.      Social History     Socioeconomic History   • Marital status:      Spouse name: Not on file   • Number of children: Not on file   • Years of education: Not on file   • Highest education level: Not on file   Tobacco Use   • Smoking status: Never Smoker   • Smokeless tobacco: Current User     Types: Snuff   Substance and Sexual Activity   • Alcohol use: Yes     Comment: socially   • Drug use: Never   • Sexual activity: Defer         Current Outpatient Medications   Medication Sig Dispense Refill   • aspirin 81 MG EC tablet Take 1 tablet by mouth Daily. (Patient taking differently: Take 81 mg by mouth Daily. Pt takes every two to three days due to stomach hurting.) 30 tablet 2   • fenofibrate (TRICOR) 145 MG tablet Take 1 tablet by mouth Daily. 30 tablet 2   • benazepril (LOTENSIN) 10 MG tablet Take 10 mg by mouth Daily.     • dilTIAZem CD (Cardizem CD) 180 MG 24 hr capsule Take 1 capsule by mouth Daily. 90 capsule 3   • meloxicam (MOBIC) 15 MG tablet TAKE 1 TABLET BY MOUTH ONCE DAILY WITH FOOD AS NEEDED       No current facility-administered medications for this visit.         OBJECTIVE    /82 (BP Location: Left arm, Patient Position: Sitting, Cuff Size: Adult)   Pulse 80   Temp 98.7 °F (37.1 °C)   Ht 177.8 cm (70\")   Wt 105 kg (232 lb 3.2 oz)   SpO2 97%   BMI 33.32 kg/m²         Review of Systems     Constitutional:  Denies recent weight loss, weight gain, fever or chills     HENT:  Denies any hearing loss, epistaxis, hoarseness, or difficulty speaking.     Eyes: Wears eyeglasses or contact lenses     Respiratory:  Denies dyspnea with exertion, no cough, wheezing, or hemoptysis.     Cardiovascular: Negative for palpitations, chest pain, orthopnea, PND    Gastrointestinal:  Denies change in " bowel habits, dyspepsia, ulcer disease, hematochezia, or melena.     Endocrine: Negative for cold intolerance, heat intolerance, polydipsia, polyphagia and polyuria.     Genitourinary: Negative.      Musculoskeletal: Denies any history of arthritic symptoms or back problems     Skin:  Denies any change in hair or nails, rashes, or skin lesions.     Allergic/Immunologic: Negative.  Negative for environmental allergies, food allergies and/or immunocompromised state.     Neurological:  Denies any history of recurrent headaches, strokes, TIA, or seizure disorder.     Hematological: Denies any food allergies, seasonal allergies, bleeding disorders, or lymphadenopathy.     Psychiatric/Behavioral: Denies any history of depression, substance abuse, or change in cognitive function.         Physical Exam     Constitutional: Cooperative, alert and oriented, well-developed, well-nourished, in no acute distress.  Obese    HENT:   Head: Normocephalic, normal hair patterns, no masses or tenderness.  Ears, Nose, and Throat: No gross abnormalities. No pallor or cyanosis. Dentition good.   Eyes: EOMS intact, PERRL, conjunctivae and lids unremarkable. Fundoscopic exam and visual fields not performed.   Neck: No palpable masses or adenopathy, no thyromegaly, no JVD, carotid pulses are full and equal bilaterally and without bruit.     Cardiovascular: Regular rhythm, S1 and S2 normal, no S3 or S4.  No murmurs, gallops, or rubs detected.     Pulmonary/Chest: Chest: normal symmetry, normal respiratory excursion, no intercostal retraction, no use of accessory muscles.     Pulmonary: Normal breath sounds. No rales or rhonchi.    Abdominal: Abdomen soft, bowel sounds normoactive, no masses, no hepatosplenomegaly, nontender, no bruit.     Musculoskeletal: No deformities, clubbing, cyanosis, erythema, or edema observed.     Neurological: No gross motor or sensory deficits noted, affect appropriate, oriented to time, person, place.     Skin:  Warm and dry to the touch, no apparent skin lesion or mass noted.     Psychiatric: He has a normal mood and affect. His behavior is normal. Judgment and thought content normal.         Procedures      Lab Results   Component Value Date    WBC 9.14 07/11/2021    HGB 14.1 07/11/2021    HCT 41.8 07/11/2021    MCV 89.9 07/11/2021     07/11/2021     Lab Results   Component Value Date    GLUCOSE 93 07/11/2021    BUN 13 07/11/2021    CREATININE 0.99 07/11/2021    EGFRIFNONA 80 07/11/2021    EGFRIFAFRI 60 (L) 07/10/2021    BCR 13.1 07/11/2021    CO2 25.0 07/11/2021    CALCIUM 8.4 (L) 07/11/2021    ALBUMIN 4.60 07/10/2021    AST 35 07/10/2021    ALT 41 07/10/2021     Lab Results   Component Value Date    CHOL 143 07/10/2021     Lab Results   Component Value Date    TRIG 199 (H) 07/10/2021     Lab Results   Component Value Date    HDL 56 07/10/2021     No components found for: LDLCALC  Lab Results   Component Value Date    LDL 55 07/10/2021     No results found for: HDLLDLRATIO  No components found for: CHOLHDL  No results found for: HGBA1C  Lab Results   Component Value Date    TSH 1.360 07/10/2021           ASSESSMENT AND PLAN  Jovan Franco is a 51-year-old male who is stable with no significant recurrence of supraventricular tachyarrhythmia.  After reviewing the situation I have increased the dose of diltiazem CD to 180 mg daily and antiplatelet therapy with aspirin and lipid-lowering therapy with fenofibrate have been continued.  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  I did discuss EP studies and AV aidan modification, but the patient wants to think about it.  Pros and cons were discussed.    Diagnoses and all orders for this visit:    1. New onset atrial fibrillation (CMS/HCC) (Primary)    2. Paroxysmal SVT (supraventricular tachycardia) (CMS/HCC)    3. Obstructive sleep apnea syndrome    4. Hypertriglyceridemia    Other orders  -      dilTIAZem CD (Cardizem CD) 180 MG 24 hr capsule; Take 1 capsule by mouth Daily.  Dispense: 90 capsule; Refill: 3        Patient's Body mass index is 33.32 kg/m². indicating that he is obese (BMI >30). Obesity-related health conditions include the following: obstructive sleep apnea and dyslipidemias. Obesity is unchanged. BMI is is above average; BMI management plan is completed. We discussed portion control and increasing exercise..      Jovan Franco  reports that he has never smoked. His smokeless tobacco use includes snuff..             Zachariah Lambert MD  8/16/2021  18:57 CDT

## 2021-10-05 RX ORDER — DILTIAZEM HYDROCHLORIDE 180 MG/1
180 CAPSULE, COATED, EXTENDED RELEASE ORAL DAILY
Qty: 90 CAPSULE | Refills: 3 | Status: SHIPPED | OUTPATIENT
Start: 2021-10-05

## 2021-10-05 RX ORDER — FENOFIBRATE 145 MG/1
145 TABLET, COATED ORAL DAILY
Qty: 90 TABLET | Refills: 3 | Status: SHIPPED | OUTPATIENT
Start: 2021-10-05
